# Patient Record
Sex: FEMALE | Race: WHITE | Employment: STUDENT | URBAN - METROPOLITAN AREA
[De-identification: names, ages, dates, MRNs, and addresses within clinical notes are randomized per-mention and may not be internally consistent; named-entity substitution may affect disease eponyms.]

---

## 2023-01-21 ENCOUNTER — HOSPITAL ENCOUNTER (EMERGENCY)
Age: 23
Discharge: HOME | End: 2023-01-21
Attending: STUDENT IN AN ORGANIZED HEALTH CARE EDUCATION/TRAINING PROGRAM
Payer: COMMERCIAL

## 2023-01-21 ENCOUNTER — APPOINTMENT (OUTPATIENT)
Dept: GENERAL RADIOLOGY | Age: 23
End: 2023-01-21
Payer: COMMERCIAL

## 2023-01-21 VITALS
OXYGEN SATURATION: 96 % | SYSTOLIC BLOOD PRESSURE: 110 MMHG | BODY MASS INDEX: 25.76 KG/M2 | TEMPERATURE: 98 F | HEART RATE: 74 BPM | DIASTOLIC BLOOD PRESSURE: 72 MMHG | HEIGHT: 62 IN | RESPIRATION RATE: 16 BRPM | WEIGHT: 140 LBS

## 2023-01-21 DIAGNOSIS — R07.89 ATYPICAL CHEST PAIN: Primary | ICD-10-CM

## 2023-01-21 LAB
ALBUMIN SERPL-MCNC: 4.2 G/DL (ref 3.5–5)
ALBUMIN/GLOB SERPL: 1 (ref 0.4–1.6)
ALP SERPL-CCNC: 99 U/L (ref 50–130)
ALT SERPL-CCNC: 24 U/L (ref 12–65)
ANION GAP SERPL CALC-SCNC: 10 MMOL/L (ref 2–11)
AST SERPL-CCNC: 18 U/L (ref 15–37)
BASOPHILS # BLD: 0.1 K/UL (ref 0–0.2)
BASOPHILS NFR BLD: 1 % (ref 0–2)
BILIRUB SERPL-MCNC: 0.2 MG/DL (ref 0.2–1.1)
BUN SERPL-MCNC: 9 MG/DL (ref 6–23)
CALCIUM SERPL-MCNC: 9.2 MG/DL (ref 8.3–10.4)
CHLORIDE SERPL-SCNC: 108 MMOL/L (ref 101–110)
CO2 SERPL-SCNC: 24 MMOL/L (ref 21–32)
CREAT SERPL-MCNC: 0.75 MG/DL (ref 0.6–1)
DIFFERENTIAL METHOD BLD: ABNORMAL
EOSINOPHIL # BLD: 0.1 K/UL (ref 0–0.8)
EOSINOPHIL NFR BLD: 1 % (ref 0.5–7.8)
ERYTHROCYTE [DISTWIDTH] IN BLOOD BY AUTOMATED COUNT: 11.9 % (ref 11.9–14.6)
GLOBULIN SER CALC-MCNC: 4.1 G/DL (ref 2.8–4.5)
GLUCOSE SERPL-MCNC: 95 MG/DL (ref 65–100)
HCG UR QL: NEGATIVE
HCG UR QL: NEGATIVE
HCT VFR BLD AUTO: 38.8 % (ref 35.8–46.3)
HGB BLD-MCNC: 12.9 G/DL (ref 11.7–15.4)
IMM GRANULOCYTES # BLD AUTO: 0 K/UL (ref 0–0.5)
IMM GRANULOCYTES NFR BLD AUTO: 0 % (ref 0–5)
LYMPHOCYTES # BLD: 4.4 K/UL (ref 0.5–4.6)
LYMPHOCYTES NFR BLD: 56 % (ref 13–44)
MCH RBC QN AUTO: 29.6 PG (ref 26.1–32.9)
MCHC RBC AUTO-ENTMCNC: 33.2 G/DL (ref 31.4–35)
MCV RBC AUTO: 89 FL (ref 82–102)
MONOCYTES # BLD: 0.6 K/UL (ref 0.1–1.3)
MONOCYTES NFR BLD: 7 % (ref 4–12)
NEUTS SEG # BLD: 2.7 K/UL (ref 1.7–8.2)
NEUTS SEG NFR BLD: 35 % (ref 43–78)
NRBC # BLD: 0 K/UL (ref 0–0.2)
PLATELET # BLD AUTO: 262 K/UL (ref 150–450)
PMV BLD AUTO: 10.1 FL (ref 9.4–12.3)
POTASSIUM SERPL-SCNC: 3.6 MMOL/L (ref 3.5–5.1)
PROT SERPL-MCNC: 8.3 G/DL (ref 6.3–8.2)
RBC # BLD AUTO: 4.36 M/UL (ref 4.05–5.2)
SODIUM SERPL-SCNC: 142 MMOL/L (ref 133–143)
TROPONIN I SERPL HS-MCNC: 3.6 PG/ML (ref 0–14)
TROPONIN I SERPL HS-MCNC: 3.9 PG/ML (ref 0–14)
WBC # BLD AUTO: 7.9 K/UL (ref 4.3–11.1)

## 2023-01-21 PROCEDURE — 81025 URINE PREGNANCY TEST: CPT

## 2023-01-21 PROCEDURE — 80053 COMPREHEN METABOLIC PANEL: CPT

## 2023-01-21 PROCEDURE — 84484 ASSAY OF TROPONIN QUANT: CPT

## 2023-01-21 PROCEDURE — 99285 EMERGENCY DEPT VISIT HI MDM: CPT

## 2023-01-21 PROCEDURE — 71046 X-RAY EXAM CHEST 2 VIEWS: CPT

## 2023-01-21 PROCEDURE — 85025 COMPLETE CBC W/AUTO DIFF WBC: CPT

## 2023-01-21 ASSESSMENT — LIFESTYLE VARIABLES
HOW OFTEN DO YOU HAVE A DRINK CONTAINING ALCOHOL: 2-4 TIMES A MONTH
HOW MANY STANDARD DRINKS CONTAINING ALCOHOL DO YOU HAVE ON A TYPICAL DAY: 1 OR 2

## 2023-01-21 ASSESSMENT — PAIN SCALES - GENERAL: PAINLEVEL_OUTOF10: 3

## 2023-01-21 ASSESSMENT — PAIN DESCRIPTION - DESCRIPTORS: DESCRIPTORS: TIGHTNESS

## 2023-01-21 ASSESSMENT — PAIN DESCRIPTION - LOCATION: LOCATION: CHEST

## 2023-01-21 NOTE — ED PROVIDER NOTES
Emergency Department Provider Note                   PCP:                None None               Age: 20 y.o. Sex: female       ICD-10-CM    1. Atypical chest pain  R07.89           DISPOSITION Decision To Discharge 01/21/2023 09:53:29 PM        Medical Decision Making  Well-appearing, slightly anxious 76-year-old female presents to the Emergency department with atypical chest discomfort. Will obtain a broad-based work-up. Patient with normal O2 saturation, normal Heart rate and is afebrile. Lab work shows normal white count, stable H&H, normal electrolytes and kidney function, normal LFTs, troponin 3.9, negative hCG, normal chest x-ray. Repeat troponins remain normal.  Patient will be discharged home with plan to alternate Tylenol and Motrin for symptoms. Given strict turn precautions. They voiced understanding and agreement with this plan. Amount and/or Complexity of Data Reviewed  Independent Historian: spouse  External Data Reviewed: notes. Details: Patient had COVID 2 months ago  Labs: ordered. Decision-making details documented in ED Course. Radiology: ordered and independent interpretation performed. Details: No large pneumothorax noted  ECG/medicine tests: ordered and independent interpretation performed. Details: EKG shows sinus rhythm, rate 82, , QRS 89, QTc 437, normal axis, Q waves noted inferiorly, no significant ST elevation or depression.                                 Orders Placed This Encounter   Procedures    XR CHEST (2 VW)    CBC with Auto Differential    CMP    Troponin    Pregnancy, Urine    Troponin    POC Pregnancy Urine Qual    POC Pregnancy Urine Qual    EKG 12 Lead        Medications - No data to display    New Prescriptions    No medications on file        Elton Gilliam is a 20 y.o. female who presents to the Emergency Department with chief complaint of    Chief Complaint   Patient presents with    Chest Pain      76-year-old female presents to the emergency department complaint of left-sided chest pain that rates to her left arm. Described as a pressure, currently 5/10 in severity. No nausea, vomiting diaphoresis. No pleuritic nor exertional component. No history of similar symptoms. No swelling lower extremities. Does have IUD. 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 Also takes Abilify. No recent caffeine or drug ingestion. States she felt that her heart was racing. No shortness of breath. Review of Systems    Past Medical History:   Diagnosis Date    Asthma     Cysts of both ovaries     Marshall-Danlos, hypermobile type     Hyperglycemia     Migraine     Shortened VA interval         Past Surgical History:   Procedure Laterality Date    KNEE SURGERY      3 x        No family history on file.      Social History     Socioeconomic History    Marital status: Single   Tobacco Use    Smoking status: Never    Smokeless tobacco: Never   Vaping Use    Vaping Use: Never used   Substance and Sexual Activity    Alcohol use: Yes    Drug use: Never         Penicillins, Adhesive tape, and Sulfa antibiotics     Previous Medications    ACETAMINOPHEN (TYLENOL) 500 MG TABLET    Take 1,000 mg by mouth daily    ALBUTEROL (PROVENTIL) (5 MG/ML) 0.5% NEBULIZER SOLUTION    Inhale into the lungs as needed    ARIPIPRAZOLE (ABILIFY) 10 MG TABLET    TAKE 1 TABLET BY MOUTH EVERY DAY FOR 30 DAYS    CETIRIZINE (ZYRTEC) 10 MG TABLET    cetirizine 10 mg tablet   take 1 tablet by mouth once daily    MONTELUKAST (SINGULAIR) 10 MG TABLET    montelukast 10 mg tablet   TAKE 1 TABLET BY MOUTH EVERYDAY AT BEDTIME    NAPROXEN SODIUM 220 MG CAPS    Take 440 mg by mouth    PROPRANOLOL (INDERAL) 10 MG TABLET    TAKE 1 TABLET BY MOUTH TWICE A DAY    TRAZODONE (DESYREL) 50 MG TABLET    TAKE 1 TABLET BY MOUTH EVERY DAY AT BEDTIME FOR 30 DAYS    VITAMIN D (CHOLECALCIFEROL) 25 MCG (1000 UT) TABS TABLET    Vitamin D   1 daily        Vitals signs and nursing note reviewed. Patient Vitals for the past 4 hrs:   Temp Pulse Resp BP SpO2   01/21/23 2145 98 °F (36.7 °C) -- -- -- --   01/21/23 2130 -- 74 16 110/72 96 %   01/21/23 2128 -- 79 20 124/74 96 %   01/21/23 1853 -- 95 12 130/79 96 %   01/21/23 1843 -- 82 16 (!) 140/89 97 %   01/21/23 1833 -- 86 20 126/77 98 %   01/21/23 1813 -- 89 20 121/83 97 %   01/21/23 1802 -- 83 21 136/83 98 %          Physical Exam  Vitals and nursing note reviewed. Constitutional:       General: She is not in acute distress. Appearance: Normal appearance. HENT:      Head: Normocephalic. Nose: Nose normal.      Mouth/Throat:      Mouth: Mucous membranes are moist.   Eyes:      Extraocular Movements: Extraocular movements intact. Cardiovascular:      Rate and Rhythm: Normal rate and regular rhythm. Pulses: Normal pulses. Heart sounds: Normal heart sounds. Pulmonary:      Effort: Pulmonary effort is normal. No respiratory distress. Breath sounds: Normal breath sounds. Abdominal:      General: Abdomen is flat. Palpations: Abdomen is soft. Tenderness: There is no abdominal tenderness. Musculoskeletal:         General: No swelling or tenderness. Normal range of motion. Cervical back: Normal range of motion. No rigidity. Right lower leg: No edema. Left lower leg: No edema. Skin:     General: Skin is warm. Findings: No rash. Neurological:      General: No focal deficit present. Mental Status: She is alert and oriented to person, place, and time. Psychiatric:         Mood and Affect: Mood normal.        Procedures    Results for orders placed or performed during the hospital encounter of 01/21/23   XR CHEST (2 VW)    Narrative    Chest X-ray    INDICATION: Left-sided chest pain    PA and lateral views of the chest were obtained. FINDINGS: The lungs are clear. There are no infiltrates or effusions. The heart  size is normal.  The bony thorax is intact.         Impression    No acute findings in the chest     CBC with Auto Differential   Result Value Ref Range    WBC 7.9 4.3 - 11.1 K/uL    RBC 4.36 4.05 - 5.2 M/uL    Hemoglobin 12.9 11.7 - 15.4 g/dL    Hematocrit 38.8 35.8 - 46.3 %    MCV 89.0 82.0 - 102.0 FL    MCH 29.6 26.1 - 32.9 PG    MCHC 33.2 31.4 - 35.0 g/dL    RDW 11.9 11.9 - 14.6 %    Platelets 481 115 - 701 K/uL    MPV 10.1 9.4 - 12.3 FL    nRBC 0.00 0.0 - 0.2 K/uL    Differential Type AUTOMATED      Seg Neutrophils 35 (L) 43 - 78 %    Lymphocytes 56 (H) 13 - 44 %    Monocytes 7 4.0 - 12.0 %    Eosinophils % 1 0.5 - 7.8 %    Basophils 1 0.0 - 2.0 %    Immature Granulocytes 0 0.0 - 5.0 %    Segs Absolute 2.7 1.7 - 8.2 K/UL    Absolute Lymph # 4.4 0.5 - 4.6 K/UL    Absolute Mono # 0.6 0.1 - 1.3 K/UL    Absolute Eos # 0.1 0.0 - 0.8 K/UL    Basophils Absolute 0.1 0.0 - 0.2 K/UL    Absolute Immature Granulocyte 0.0 0.0 - 0.5 K/UL   CMP   Result Value Ref Range    Sodium 142 133 - 143 mmol/L    Potassium 3.6 3.5 - 5.1 mmol/L    Chloride 108 101 - 110 mmol/L    CO2 24 21 - 32 mmol/L    Anion Gap 10 2 - 11 mmol/L    Glucose 95 65 - 100 mg/dL    BUN 9 6 - 23 MG/DL    Creatinine 0.75 0.6 - 1.0 MG/DL    Est, Glom Filt Rate >60 >60 ml/min/1.73m2    Calcium 9.2 8.3 - 10.4 MG/DL    Total Bilirubin 0.2 0.2 - 1.1 MG/DL    ALT 24 12 - 65 U/L    AST 18 15 - 37 U/L    Alk Phosphatase 99 50 - 130 U/L    Total Protein 8.3 (H) 6.3 - 8.2 g/dL    Albumin 4.2 3.5 - 5.0 g/dL    Globulin 4.1 2.8 - 4.5 g/dL    Albumin/Globulin Ratio 1.0 0.4 - 1.6     Troponin   Result Value Ref Range    Troponin, High Sensitivity 3.9 0 - 14 pg/mL   Troponin   Result Value Ref Range    Troponin, High Sensitivity 3.6 0 - 14 pg/mL   POC Pregnancy Urine Qual   Result Value Ref Range    Preg Test, Ur Negative NEG     POC Pregnancy Urine Qual   Result Value Ref Range    Preg Test, Ur Negative NEG          XR CHEST (2 VW)   Final Result   No acute findings in the chest                             Voice dictation software was used during the making of this note. This software is not perfect and grammatical and other typographical errors may be present. This note has not been completely proofread for errors.         Kera Chicas DO  01/21/23 2150

## 2023-01-22 NOTE — ED NOTES
I have reviewed discharge instructions with the patient and spouse/boyfriend. The patient and spouse/boyfriend verbalized understanding. Patient left ED via Discharge Method: ambulatory to Home with family. Opportunity for questions and clarification provided. Patient given 0 scripts. To continue your aftercare when you leave the hospital, you may receive an automated call from our care team to check in on how you are doing. This is a free service and part of our promise to provide the best care and service to meet your aftercare needs.  If you have questions, or wish to unsubscribe from this service please call [phone]. Thank you for Choosing our [facility] Emergency Department.           Ben Rutledge RN  01/21/23 1694

## 2023-01-22 NOTE — DISCHARGE INSTRUCTIONS
Alternate Tylenol and Motrin as needed for chest discomfort. Follow-up with primary care physician within 1 week. Return to the ER for worsening or worrisome symptoms.

## 2023-06-08 ENCOUNTER — TELEPHONE (OUTPATIENT)
Dept: ORTHOPEDIC SURGERY | Age: 23
End: 2023-06-08

## 2023-06-08 ENCOUNTER — OFFICE VISIT (OUTPATIENT)
Dept: ORTHOPEDIC SURGERY | Age: 23
End: 2023-06-08

## 2023-06-08 DIAGNOSIS — M25.531 RIGHT WRIST PAIN: Primary | ICD-10-CM

## 2023-06-08 RX ORDER — MELOXICAM 15 MG/1
15 TABLET ORAL DAILY
Qty: 30 TABLET | Refills: 0 | Status: SHIPPED | OUTPATIENT
Start: 2023-06-08 | End: 2023-07-08

## 2023-06-08 RX ORDER — TRAMADOL HYDROCHLORIDE 50 MG/1
50 TABLET ORAL EVERY 6 HOURS PRN
Qty: 20 TABLET | Refills: 0 | Status: SHIPPED | OUTPATIENT
Start: 2023-06-08 | End: 2023-06-13

## 2023-06-08 RX ORDER — HYDROCODONE BITARTRATE AND ACETAMINOPHEN 7.5; 325 MG/1; MG/1
1 TABLET ORAL EVERY 6 HOURS PRN
Qty: 20 TABLET | Refills: 0 | Status: SHIPPED | OUTPATIENT
Start: 2023-06-08 | End: 2023-06-09

## 2023-06-08 NOTE — TELEPHONE ENCOUNTER
She is calling to see if the meds were sent in. I told her I didn't see that they had but also her correct pharmacy wasn't in but it is now. [facility] in [address].

## 2023-06-08 NOTE — PROGRESS NOTES
Patient was fitted and instructed on an  Wrist Splint for patients right wrist. Patient is aware the hand slides in the brace with the thumb placed threw the thumb hole. I demonstrated the correct way to tighten the brace straps to allow for a comfortable fit. Patient understood the correct way to wear the brace. Patient read and signed documenting they understand and agree to [facility]'s current DME return policy.
options. We discussed observation, therapy, antiinflammatory medications and other pertinent treatment modalities. After discussing in detail the patient elects to proceed with MRI of the right wrist.  She is concerned with ligamentous injury given her Marshall-Danlos syndrome. She felt and heard a pop. She now has decreased pain. She has not responded to muscle relaxers, anti-inflammatories, steroids. We will give her a brace that fits her more appropriately. I will give her Mobic for 4 weeks. I will give her pain medication. We will see her back after MRI is completed. I will give her a work note she can return to work Monday working half days until her MRI is completed. .     Patient voiced accordance and understanding of the information provided and the formulated plan. All questions were answered to the patient's satisfaction during the encounter.     4 This is an acute complicated injury  Treatment at this time: Prescription medication, Brace, and MRI/CT    DEONTE Diaz - CNP  Orthopaedic Surgery  06/08/23  1:17 PM

## 2023-06-09 ENCOUNTER — OFFICE VISIT (OUTPATIENT)
Dept: ORTHOPEDIC SURGERY | Age: 23
End: 2023-06-09

## 2023-06-09 DIAGNOSIS — M25.531 RIGHT WRIST PAIN: Primary | ICD-10-CM

## 2023-06-09 RX ORDER — HYDROCODONE BITARTRATE AND ACETAMINOPHEN 5; 325 MG/1; MG/1
1 TABLET ORAL EVERY 4 HOURS PRN
Qty: 28 TABLET | Refills: 0 | Status: SHIPPED | OUTPATIENT
Start: 2023-06-09 | End: 2023-06-14

## 2023-06-09 RX ORDER — BETAMETHASONE SODIUM PHOSPHATE AND BETAMETHASONE ACETATE 3; 3 MG/ML; MG/ML
6 INJECTION, SUSPENSION INTRA-ARTICULAR; INTRALESIONAL; INTRAMUSCULAR; SOFT TISSUE ONCE
Status: COMPLETED | OUTPATIENT
Start: 2023-06-09 | End: 2023-06-09

## 2023-06-09 RX ADMIN — BETAMETHASONE SODIUM PHOSPHATE AND BETAMETHASONE ACETATE 6 MG: 3; 3 INJECTION, SUSPENSION INTRA-ARTICULAR; INTRALESIONAL; INTRAMUSCULAR; SOFT TISSUE at 12:02

## 2023-06-09 NOTE — PROGRESS NOTES
The patient was prescribed and fitted with a Wrist Widget for the right wrist. She was instructed by the provider to wear it along with the Wrist Elaine Daniel she received at an earlier appointment. Patient read and signed documenting they understand and agree to [facility]'s current DME return policy.

## 2023-06-09 NOTE — PROGRESS NOTES
Orthopaedic Hand Clinic Note    Name: Delaney Vera  YOB: 2000  Gender: female  MRN: [id number]      Follow up visit:   1. Right wrist pain        HPI: Delaney Vera is a 24 y.o. female who is following up for right wrist pain/injury. She is here for her MRI follow-up. She was last seen yesterday. She says the wrist brace feels more supportive. ROS/Meds/PSH/PMH/FH/SH: I personally reviewed the patients standard intake form. Pertinents are discussed in the HPI    Physical Examination:    Musculoskeletal Examination:  Examination on the right upper extremity demonstrates cap refill < 5 seconds in all fingers  Patient has swelling to the right wrist.  There is no ecchymosis present. She has decreased range of motion secondary to pain and swelling. She is very guarded on examination. She is tender to palpation over the distal radius, distal ulna, TFCC, radiocarpal joint, DRUJ. She is diffusely tender throughout the entire hand. She is unable to make a composite fist.  She does have full extension of her digits. She reports some paresthesia. She has good capillary refill. Dr. Regine Sommers has examined the patient as well. Imaging / Electrodiagnostic Tests:     MRI of her right wrist is reviewed with Dr. Regine Sommers. Patient has inflammation throughout the right wrist.  No fractures or tears are identified. Assessment:     ICD-10-CM    1. Right wrist pain  M25.531 HYDROcodone-acetaminophen (NORCO) 5-325 MG per tablet     Ambulatory referral to Occupational Therapy     Ambulatory Referral to DME     OH ARTHROCENTESIS ASPIR&/INJ INTERM JT/BURS W/O US     betamethasone acetate-betamethasone sodium phosphate (CELESTONE) injection 6 mg          Plan:   We discussed the diagnosis and different treatment options. We discussed observation, therapy, antiinflammatory medications and other pertinent treatment modalities.     After discussing in detail the patient elects to proceed with right wrist

## 2023-06-29 ENCOUNTER — TELEPHONE (OUTPATIENT)
Dept: ORTHOPEDIC SURGERY | Age: 23
End: 2023-06-29

## 2023-07-07 ENCOUNTER — CLINICAL DOCUMENTATION (OUTPATIENT)
Dept: ORTHOPEDIC SURGERY | Age: 23
End: 2023-07-07

## 2023-07-07 ENCOUNTER — TELEPHONE (OUTPATIENT)
Dept: ORTHOPEDIC SURGERY | Age: 23
End: 2023-07-07

## 2023-07-07 ENCOUNTER — OFFICE VISIT (OUTPATIENT)
Dept: ORTHOPEDIC SURGERY | Age: 23
End: 2023-07-07

## 2023-07-07 DIAGNOSIS — M25.531 RIGHT WRIST PAIN: Primary | ICD-10-CM

## 2023-07-07 PROCEDURE — 99214 OFFICE O/P EST MOD 30 MIN: CPT | Performed by: NURSE PRACTITIONER

## 2023-07-07 RX ORDER — MELOXICAM 15 MG/1
15 TABLET ORAL DAILY
Qty: 30 TABLET | Refills: 0 | Status: SHIPPED | OUTPATIENT
Start: 2023-07-07 | End: 2023-08-06

## 2023-07-07 NOTE — TELEPHONE ENCOUNTER
Adair Adithya  has  partial  records in top  desk  tray. Waiting on  more  records  from [facility].     Has  had  several  left knee  sx    Needs sports  med  dr houser    Pt  of  [facility]

## 2023-07-07 NOTE — PROGRESS NOTES
Patient was fitted and instructed on an  Wrist Splint for patients right wrist. Patient is aware the hand slides in the brace with the thumb placed threw the thumb hole. I demonstrated the correct way to tighten the brace straps to allow for a comfortable fit. Patient understood the correct way to wear the brace. Patient read and signed documenting they understand and agree to [facility]'s current DME return policy.
CNP  Orthopaedic Surgery  07/07/23  9:49 AM

## 2023-07-07 NOTE — PROGRESS NOTES
Patient signed releases to have records sent to us from [address] [place] [address], releases faxed and receipt shows received. Copies to scanning.

## 2023-07-10 ENCOUNTER — TELEPHONE (OUTPATIENT)
Dept: ORTHOPEDIC SURGERY | Age: 23
End: 2023-07-10

## 2023-07-10 NOTE — TELEPHONE ENCOUNTER
I returned patient call. I let her know that a refill of mobic was sent in for her on 7/7/23. I explained that [facility] Zaragoza [facility] [address] would like for her to continue taking the mobic but if I she would like to discontinue taking the medication that she is able to do that. She will call with any other questions or concerns.